# Patient Record
Sex: MALE | Employment: FULL TIME | URBAN - METROPOLITAN AREA
[De-identification: names, ages, dates, MRNs, and addresses within clinical notes are randomized per-mention and may not be internally consistent; named-entity substitution may affect disease eponyms.]

---

## 2017-08-11 PROBLEM — K21.9 GERD (GASTROESOPHAGEAL REFLUX DISEASE): Status: ACTIVE | Noted: 2017-08-11

## 2017-08-11 PROBLEM — I10 HYPERTENSION: Status: ACTIVE | Noted: 2017-08-11

## 2017-08-11 PROBLEM — Z00.00 ANNUAL PHYSICAL EXAM: Status: ACTIVE | Noted: 2017-08-11

## 2017-08-11 PROBLEM — Z79.899 ON STATIN THERAPY: Status: ACTIVE | Noted: 2017-08-11

## 2017-08-11 PROBLEM — E78.5 HYPERLIPIDEMIA: Status: ACTIVE | Noted: 2017-08-11

## 2017-08-11 PROBLEM — N17.9 RENAL FAILURE, ACUTE (HCC): Status: ACTIVE | Noted: 2017-08-11

## 2017-08-11 RX ORDER — BISOPROLOL FUMARATE 10 MG/1
10 TABLET ORAL DAILY
COMMUNITY
End: 2017-10-05 | Stop reason: SDUPTHER

## 2017-08-11 RX ORDER — PHENOL/SODIUM PHENOLATE
20 AEROSOL, SPRAY (ML) MUCOUS MEMBRANE DAILY
COMMUNITY
End: 2019-03-11 | Stop reason: SDUPTHER

## 2017-08-24 ENCOUNTER — OFFICE VISIT (OUTPATIENT)
Dept: INTERNAL MEDICINE CLINIC | Age: 57
End: 2017-08-24

## 2017-08-24 VITALS
HEIGHT: 65 IN | SYSTOLIC BLOOD PRESSURE: 140 MMHG | BODY MASS INDEX: 26.16 KG/M2 | DIASTOLIC BLOOD PRESSURE: 78 MMHG | WEIGHT: 157 LBS

## 2017-08-24 DIAGNOSIS — I10 ESSENTIAL HYPERTENSION: Primary | ICD-10-CM

## 2017-08-24 DIAGNOSIS — Z79.899 ON STATIN THERAPY: ICD-10-CM

## 2017-08-24 DIAGNOSIS — E78.00 PURE HYPERCHOLESTEROLEMIA: ICD-10-CM

## 2017-08-24 PROBLEM — E78.5 HYPERLIPIDEMIA: Status: RESOLVED | Noted: 2017-08-11 | Resolved: 2017-08-24

## 2017-08-24 NOTE — PROGRESS NOTES
This note will not be viewable in [address] Chau Brothers is a 60 y.o. male and presents with Hypertension (6 months) and GERD  . Subjective:  Mr. Kirstin Connors presents today for follow-up of hypertension reflux disease. Remains on statin therapy for hyperlipidemia. He denies any significant complaints related to his medications. He has no congestion cough shortness of breath PND orthopnea pedal edema with chest pain. Past Medical History:   Diagnosis Date    Annual physical exam 8/11/2017    GERD (gastroesophageal reflux disease) 8/11/2017    Hyperlipidemia 8/11/2017    Hypertension 8/11/2017    On statin therapy 8/11/2017    Renal failure, acute ([address] 8/11/2017     History reviewed. No pertinent surgical history. No Known Allergies  Current Outpatient Prescriptions   Medication Sig Dispense Refill    ATORVASTATIN CALCIUM (ATORVASTATIN PO) Take  by mouth.  Omeprazole delayed release (PRILOSEC D/R) 20 mg tablet Take 20 mg by mouth daily.  bisoprolol (ZEBETA) 10 mg tablet Take 10 mg by mouth daily. Social History     Social History    Marital status: SINGLE     Spouse name: N/A    Number of children: N/A    Years of education: N/A     Social History Main Topics    Smoking status: Never Smoker    Smokeless tobacco: Never Used    Alcohol use Yes      Comment: occasionally    Drug use: No    Sexual activity: Not Asked     Other Topics Concern    None     Social History Narrative     Family History   Problem Relation Age of Onset    Cancer Mother     Heart Disease Father        Review of Systems  Constitutional:  negative for fevers, chills, anorexia and weight loss  Eyes:    negative for visual disturbance and irritation  ENT:    negative for tinnitus,sore throat,nasal congestion,ear pains. hoarseness  Respiratory:     negative for cough, hemoptysis, dyspnea,wheezing  CV:    negative for chest pain, palpitations, lower extremity edema  GI:    negative for nausea, vomiting, diarrhea, abdominal pain,melena  Endo:               negative for polyuria,polydipsia,polyphagia,heat intolerance  Genitourinary : negative for frequency, dysuria and hematuria  Integumentary: negative for rash and pruritus  Hematologic:   negative for easy bruising and gum/nose bleeding  Musculoskel:  negative for myalgias, arthralgias, back pain, muscle weakness, joint pain  Neurological:   negative for headaches, dizziness, vertigo, memory problems and gait   Behavl/Psych:  negative for feelings of anxiety, depression, mood changes  ROS otherwise negative      Objective:  Visit Vitals    /78    Ht 5' 5\" (1.651 m)    Wt 157 lb (71.2 kg)    BMI 26.13 kg/m2     Physical Exam:   General appearance - alert, well appearing, and in no distress  Mental status - alert, oriented to person, place, and time  EYE-AMAURY, EOMI, fundi normal, corneas normal, no foreign bodies  ENT-ENT exam normal, no neck nodes or sinus tenderness  Nose - normal and patent, no erythema, discharge or polyps  Mouth - mucous membranes moist, pharynx normal without lesions  Neck - supple, no significant adenopathy   Chest - clear to auscultation, no wheezes, rales or rhonchi, symmetric air entry   Heart - normal rate, regular rhythm, normal S1, S2, no murmurs, rubs, clicks or gallops   Abdomen - soft, nontender, nondistended, no masses or organomegaly  Lymph- no adenopathy palpable  Ext-peripheral pulses normal, no pedal edema, no clubbing or cyanosis  Skin-Warm and dry. no hyperpigmentation, vitiligo, or suspicious lesions  Neuro -alert, oriented, normal speech, no focal findings or movement disorder noted      Assessment/Plan:  Diagnoses and all orders for this visit:    1. Essential hypertension  -     METABOLIC PANEL, COMPREHENSIVE    2. On statin therapy  -     CK  -     METABOLIC PANEL, COMPREHENSIVE    3. Pure hypercholesterolemia  -     LIPID PANEL          ICD-10-CM ICD-9-CM    1.  Essential hypertension X15 867.4 METABOLIC PANEL, COMPREHENSIVE   2. On statin therapy Z79.899 N08.00 CK      METABOLIC PANEL, COMPREHENSIVE   3. Pure hypercholesterolemia E78.00 272.0 LIPID PANEL         Follow-up Disposition:  Return in about 6 months (around 2/24/2018) for CPE. I have reviewed with the patient details of the assessment and plan and all questions were answered. Relevent patient education was performed. Verbal and/or written instructions (see AVS) provided. The most recent lab findings were reviewed with the patient. An After Visit Summary was printed and given to the patient.     Rosamaria Youngblood MD

## 2017-08-24 NOTE — MR AVS SNAPSHOT
Visit Information Date & Time Provider Department Dept. Phone Encounter #  
 8/24/2017  9:10 AM ANNETTE Eli MD [address] [place] [address] [facility] [phone] [id number] Follow-up Instructions Return in about 6 months (around 2/24/2018) for CPE. Upcoming Health Maintenance Date Due Hepatitis C Screening 1960 Pneumococcal 19-64 Highest Risk (1 of 3 - PCV13) 12/5/1977 DTaP/Tdap/Td series (1 - Tdap) 12/5/1979 FOBT Q 1 YEAR AGE 50-75 12/5/2008 INFLUENZA AGE 9 TO ADULT 8/1/2017 Allergies as of 8/24/2017  Review Complete On: 8/24/2017 By: Sunita Tyler MD  
 No Known Allergies Current Immunizations  Never Reviewed No immunizations on file. Not reviewed this visit You Were Diagnosed With   
  
 Codes Comments Essential hypertension    -  Primary ICD-10-CM: I10 
ICD-9-CM: 401.9 On statin therapy     ICD-10-CM: Z79.899 ICD-9-CM: V58.69 Pure hypercholesterolemia     ICD-10-CM: E78.00 ICD-9-CM: 272.0 Vitals BP Height(growth percentile) Weight(growth percentile) BMI Smoking Status 140/78 5' 5\" (1.651 m) 157 lb (71.2 kg) 26.13 kg/m2 Never Smoker Vitals History BMI and BSA Data Body Mass Index Body Surface Area  
 26.13 kg/m 2 1.81 m 2 Your Updated Medication List  
  
   
This list is accurate as of: 8/24/17  9:50 AM.  Always use your most recent med list.  
  
  
  
  
 ATORVASTATIN PO Take  by mouth. bisoprolol 10 mg tablet Commonly known as:  Britney Bowling Take 10 mg by mouth daily. Omeprazole delayed release 20 mg tablet Commonly known as:  PRILOSEC D/R Take 20 mg by mouth daily. We Performed the Following CK [id number] CPT(R)] LIPID PANEL [83736 CPT(R)] METABOLIC PANEL, COMPREHENSIVE [79251 CPT(R)] Follow-up Instructions Return in about 6 months (around 2/24/2018) for CPE. Patient Instructions DASH Diet: Care Instructions Your Care Instructions The DASH diet is an eating plan that can help lower your blood pressure. DASH stands for Dietary Approaches to Stop Hypertension. Hypertension is high blood pressure. The DASH diet focuses on eating foods that are high in calcium, potassium, and magnesium. These nutrients can lower blood pressure. The foods that are highest in these nutrients are fruits, vegetables, low-fat dairy products, nuts, seeds, and legumes. But taking calcium, potassium, and magnesium supplements instead of eating foods that are high in those nutrients does not have the same effect. The DASH diet also includes whole grains, fish, and poultry. The DASH diet is one of several lifestyle changes your doctor may recommend to lower your high blood pressure. Your doctor may also want you to decrease the amount of sodium in your diet. Lowering sodium while following the DASH diet can lower blood pressure even further than just the DASH diet alone. Follow-up care is a key part of your treatment and safety. Be sure to make and go to all appointments, and call your doctor if you are having problems. It's also a good idea to know your test results and keep a list of the medicines you take. How can you care for yourself at home? Following the DASH diet · Eat 4 to 5 servings of fruit each day. A serving is 1 medium-sized piece of fruit, ½ cup chopped or canned fruit, 1/4 cup dried fruit, or 4 ounces (½ cup) of fruit juice. Choose fruit more often than fruit juice. · Eat 4 to 5 servings of vegetables each day. A serving is 1 cup of lettuce or raw leafy vegetables, ½ cup of chopped or cooked vegetables, or 4 ounces (½ cup) of vegetable juice. Choose vegetables more often than vegetable juice. · Get 2 to 3 servings of low-fat and fat-free dairy each day. A serving is 8 ounces of milk, 1 cup of yogurt, or 1 ½ ounces of cheese. · Eat 6 to 8 servings of grains each day.  A serving is 1 slice of bread, 1 ounce of dry cereal, or ½ cup of cooked rice, pasta, or cooked cereal. Try to choose whole-grain products as much as possible. · Limit lean meat, poultry, and fish to 2 servings each day. A serving is 3 ounces, about the size of a deck of cards. · Eat 4 to 5 servings of nuts, seeds, and legumes (cooked dried beans, lentils, and split peas) each week. A serving is 1/3 cup of nuts, 2 tablespoons of seeds, or ½ cup of cooked beans or peas. · Limit fats and oils to 2 to 3 servings each day. A serving is 1 teaspoon of vegetable oil or 2 tablespoons of salad dressing. · Limit sweets and added sugars to 5 servings or less a week. A serving is 1 tablespoon jelly or jam, ½ cup sorbet, or 1 cup of lemonade. · Eat less than 2,300 milligrams (mg) of sodium a day. If you limit your sodium to 1,500 mg a day, you can lower your blood pressure even more. Tips for success · Start small. Do not try to make dramatic changes to your diet all at once. You might feel that you are missing out on your favorite foods and then be more likely to not follow the plan. Make small changes, and stick with them. Once those changes become habit, add a few more changes. · Try some of the following: ¨ Make it a goal to eat a fruit or vegetable at every meal and at snacks. This will make it easy to get the recommended amount of fruits and vegetables each day. ¨ Try yogurt topped with fruit and nuts for a snack or healthy dessert. ¨ Add lettuce, tomato, cucumber, and onion to sandwiches. ¨ Combine a ready-made pizza crust with low-fat mozzarella cheese and lots of vegetable toppings. Try using tomatoes, squash, spinach, broccoli, carrots, cauliflower, and onions. ¨ Have a variety of cut-up vegetables with a low-fat dip as an appetizer instead of chips and dip. ¨ Sprinkle sunflower seeds or chopped almonds over salads. Or try adding chopped walnuts or almonds to cooked vegetables. ¨ Try some vegetarian meals using beans and peas. Add garbanzo or kidney beans to salads. Make burritos and tacos with mashed das beans or black beans. Where can you learn more? Go to http://sarah-tha.info/. Enter Y964 in the search box to learn more about \"DASH Diet: Care Instructions. \" Current as of: April 3, 2017 Content Version: 11.3 © 7837-6575 No Paper Just [employer]apor. Care instructions adapted under license by [employer] (which disclaims liability or warranty for this information). If you have questions about a medical condition or this instruction, always ask your healthcare professional. [address] any warranty or liability for your use of this information. Introducing [facility]! Silverio Ferguson introduces Mavin patient portal. Now you can access parts of your medical record, email your doctor's office, and request medication refills online. 1. In your internet browser, go to [email or web address] 2. Click on the First Time User? Click Here link in the Sign In box. You will see the New Member Sign Up page. 3. Enter your Mavin Access Code exactly as it appears below. You will not need to use this code after youve completed the sign-up process. If you do not sign up before the expiration date, you must request a new code. · Mavin Access Code: [email or web address] Expires: 11/22/2017  9:18 AM 
 
4. Enter the last four digits of your Social Security Number (xxxx) and Date of Birth (mm/dd/yyyy) as indicated and click Submit. You will be taken to the next sign-up page. 5. Create a Mavin ID. This will be your Mavin login ID and cannot be changed, so think of one that is secure and easy to remember. 6. Create a Mavin password. You can change your password at any time. 7. Enter your Password Reset Question and Answer. This can be used at a later time if you forget your password. 8. Enter your e-mail address. You will receive e-mail notification when new information is available in [address] 9. Click Sign Up. You can now view and download portions of your medical record. 10. Click the Download Summary menu link to download a portable copy of your medical information. If you have questions, please visit the Frequently Asked Questions section of the Skim.it website. Remember, Skim.it is NOT to be used for urgent needs. For medical emergencies, dial 911. Now available from your iPhone and Android! Please provide this summary of care documentation to your next provider. Your primary care clinician is listed as ANNETTE Browne. If you have any questions after today's visit, please call [phone].

## 2017-08-24 NOTE — PATIENT INSTRUCTIONS

## 2017-08-24 NOTE — PROGRESS NOTES
Present to Hypertension and GERD. 1. Have you been to the ER, urgent care clinic since your last visit? Hospitalized since your last visit? No    2. Have you seen or consulted any other health care providers outside of the [address] [place] [address] since your last visit? Include any pap smears or colon screening.  No

## 2017-08-25 LAB
ALBUMIN SERPL-MCNC: 4.4 G/DL (ref 3.5–5.5)
ALBUMIN/GLOB SERPL: 1.9 {RATIO} (ref 1.2–2.2)
ALP SERPL-CCNC: 76 IU/L (ref 39–117)
ALT SERPL-CCNC: 15 IU/L (ref 0–44)
AST SERPL-CCNC: 17 IU/L (ref 0–40)
BILIRUB SERPL-MCNC: 1.4 MG/DL (ref 0–1.2)
BUN SERPL-MCNC: 18 MG/DL (ref 6–24)
BUN/CREAT SERPL: 18 (ref 9–20)
CALCIUM SERPL-MCNC: 9.3 MG/DL (ref 8.7–10.2)
CHLORIDE SERPL-SCNC: 101 MMOL/L (ref 96–106)
CHOLEST SERPL-MCNC: 121 MG/DL (ref 100–199)
CK SERPL-CCNC: 50 U/L (ref 24–204)
CO2 SERPL-SCNC: 26 MMOL/L (ref 18–29)
CREAT SERPL-MCNC: 0.99 MG/DL (ref 0.76–1.27)
GLOBULIN SER CALC-MCNC: 2.3 G/DL (ref 1.5–4.5)
GLUCOSE SERPL-MCNC: 110 MG/DL (ref 65–99)
HDLC SERPL-MCNC: 45 MG/DL
LDLC SERPL CALC-MCNC: 65 MG/DL (ref 0–99)
POTASSIUM SERPL-SCNC: 4.6 MMOL/L (ref 3.5–5.2)
PROT SERPL-MCNC: 6.7 G/DL (ref 6–8.5)
SODIUM SERPL-SCNC: 142 MMOL/L (ref 134–144)
TRIGL SERPL-MCNC: 56 MG/DL (ref 0–149)
VLDLC SERPL CALC-MCNC: 11 MG/DL (ref 5–40)

## 2017-08-25 NOTE — PROGRESS NOTES
Cholesterol is excellent with a total cholesterol 151 and an LDL cholesterol 65. Glucose was normal at 110 with normal liver and kidney function. Bilirubin was just above normal but often seen in fasting and not significant.

## 2017-10-05 RX ORDER — ATORVASTATIN CALCIUM 10 MG/1
TABLET, FILM COATED ORAL
Qty: 90 TAB | Refills: 2 | Status: SHIPPED | OUTPATIENT
Start: 2017-10-05 | End: 2019-03-13

## 2017-10-05 RX ORDER — BISOPROLOL FUMARATE 10 MG/1
TABLET ORAL
Qty: 90 TAB | Refills: 2 | Status: SHIPPED | OUTPATIENT
Start: 2017-10-05 | End: 2019-03-11 | Stop reason: ALTCHOICE

## 2017-10-05 RX ORDER — OMEPRAZOLE 20 MG/1
CAPSULE, DELAYED RELEASE ORAL
Qty: 90 CAP | Refills: 2 | Status: SHIPPED | OUTPATIENT
Start: 2017-10-05

## 2019-03-11 ENCOUNTER — HOSPITAL ENCOUNTER (INPATIENT)
Age: 59
LOS: 2 days | Discharge: HOME HEALTH | DRG: 247 | End: 2019-03-13
Attending: EMERGENCY MEDICINE | Admitting: INTERNAL MEDICINE
Payer: COMMERCIAL

## 2019-03-11 DIAGNOSIS — I21.3 ST ELEVATION MYOCARDIAL INFARCTION (STEMI), UNSPECIFIED ARTERY (HCC): Primary | ICD-10-CM

## 2019-03-11 DIAGNOSIS — I21.3 ST ELEVATION (STEMI) MYOCARDIAL INFARCTION (HCC): ICD-10-CM

## 2019-03-11 PROBLEM — I21.02 ACUTE ST ELEVATION MYOCARDIAL INFARCTION (STEMI) INVOLVING LEFT ANTERIOR DESCENDING (LAD) CORONARY ARTERY (HCC): Status: ACTIVE | Noted: 2019-03-11

## 2019-03-11 LAB
ALBUMIN SERPL-MCNC: 3.6 G/DL (ref 3.5–5)
ALBUMIN/GLOB SERPL: 1.1 {RATIO} (ref 1.1–2.2)
ALP SERPL-CCNC: 81 U/L (ref 45–117)
ALT SERPL-CCNC: 21 U/L (ref 12–78)
ANION GAP SERPL CALC-SCNC: 9 MMOL/L (ref 5–15)
APTT PPP: 22.1 SEC (ref 22.1–32)
AST SERPL-CCNC: 20 U/L (ref 15–37)
ATRIAL RATE: 62 BPM
BASOPHILS # BLD: 0.1 K/UL (ref 0–0.1)
BASOPHILS NFR BLD: 1 % (ref 0–1)
BILIRUB SERPL-MCNC: 1.5 MG/DL (ref 0.2–1)
BUN SERPL-MCNC: 15 MG/DL (ref 6–20)
BUN/CREAT SERPL: 12 (ref 12–20)
CALCIUM SERPL-MCNC: 8.1 MG/DL (ref 8.5–10.1)
CALCULATED P AXIS, ECG09: 24 DEGREES
CALCULATED R AXIS, ECG10: 10 DEGREES
CALCULATED T AXIS, ECG11: 81 DEGREES
CHLORIDE SERPL-SCNC: 107 MMOL/L (ref 97–108)
CK MB CFR SERPL CALC: 7.2 % (ref 0–2.5)
CK MB SERPL-MCNC: 7.4 NG/ML (ref 5–25)
CK SERPL-CCNC: 103 U/L (ref 39–308)
CK SERPL-CCNC: 68 U/L (ref 39–308)
CO2 SERPL-SCNC: 24 MMOL/L (ref 21–32)
CREAT SERPL-MCNC: 1.21 MG/DL (ref 0.7–1.3)
DIAGNOSIS, 93000: NORMAL
DIFFERENTIAL METHOD BLD: NORMAL
END DIASTOLIC PRESSURE: 7
EOSINOPHIL # BLD: 0.1 K/UL (ref 0–0.4)
EOSINOPHIL NFR BLD: 1 % (ref 0–7)
ERYTHROCYTE [DISTWIDTH] IN BLOOD BY AUTOMATED COUNT: 11.9 % (ref 11.5–14.5)
GLOBULIN SER CALC-MCNC: 3.4 G/DL (ref 2–4)
GLUCOSE SERPL-MCNC: 170 MG/DL (ref 65–100)
HCT VFR BLD AUTO: 46 % (ref 36.6–50.3)
HGB BLD-MCNC: 15.9 G/DL (ref 12.1–17)
IMM GRANULOCYTES # BLD AUTO: 0 K/UL (ref 0–0.04)
IMM GRANULOCYTES NFR BLD AUTO: 0 % (ref 0–0.5)
INR PPP: 1 (ref 0.9–1.1)
LYMPHOCYTES # BLD: 2.7 K/UL (ref 0.8–3.5)
LYMPHOCYTES NFR BLD: 26 % (ref 12–49)
MCH RBC QN AUTO: 31.6 PG (ref 26–34)
MCHC RBC AUTO-ENTMCNC: 34.6 G/DL (ref 30–36.5)
MCV RBC AUTO: 91.5 FL (ref 80–99)
MONOCYTES # BLD: 0.6 K/UL (ref 0–1)
MONOCYTES NFR BLD: 6 % (ref 5–13)
NEUTS SEG # BLD: 6.6 K/UL (ref 1.8–8)
NEUTS SEG NFR BLD: 66 % (ref 32–75)
NRBC # BLD: 0 K/UL (ref 0–0.01)
NRBC BLD-RTO: 0 PER 100 WBC
P-R INTERVAL, ECG05: 162 MS
PLATELET # BLD AUTO: 231 K/UL (ref 150–400)
PMV BLD AUTO: 11.7 FL (ref 8.9–12.9)
POTASSIUM SERPL-SCNC: 3.6 MMOL/L (ref 3.5–5.1)
PROT SERPL-MCNC: 7 G/DL (ref 6.4–8.2)
PROTHROMBIN TIME: 10.6 SEC (ref 9–11.1)
Q-T INTERVAL, ECG07: 426 MS
QRS DURATION, ECG06: 84 MS
QTC CALCULATION (BEZET), ECG08: 432 MS
RBC # BLD AUTO: 5.03 M/UL (ref 4.1–5.7)
SODIUM SERPL-SCNC: 140 MMOL/L (ref 136–145)
THERAPEUTIC RANGE,PTTT: NORMAL SECS (ref 58–77)
TROPONIN I SERPL-MCNC: 0.07 NG/ML
TROPONIN I SERPL-MCNC: 0.87 NG/ML
VENTRICULAR RATE, ECG03: 62 BPM
WBC # BLD AUTO: 10.1 K/UL (ref 4.1–11.1)

## 2019-03-11 PROCEDURE — 84484 ASSAY OF TROPONIN QUANT: CPT

## 2019-03-11 PROCEDURE — 027034Z DILATION OF CORONARY ARTERY, ONE ARTERY WITH DRUG-ELUTING INTRALUMINAL DEVICE, PERCUTANEOUS APPROACH: ICD-10-PCS | Performed by: INTERNAL MEDICINE

## 2019-03-11 PROCEDURE — 85730 THROMBOPLASTIN TIME PARTIAL: CPT

## 2019-03-11 PROCEDURE — 99283 EMERGENCY DEPT VISIT LOW MDM: CPT

## 2019-03-11 PROCEDURE — 82553 CREATINE MB FRACTION: CPT

## 2019-03-11 PROCEDURE — C1894 INTRO/SHEATH, NON-LASER: HCPCS | Performed by: INTERNAL MEDICINE

## 2019-03-11 PROCEDURE — 93005 ELECTROCARDIOGRAM TRACING: CPT

## 2019-03-11 PROCEDURE — 77030003623 HC NDL PERC VASC TELE -C: Performed by: INTERNAL MEDICINE

## 2019-03-11 PROCEDURE — 96374 THER/PROPH/DIAG INJ IV PUSH: CPT

## 2019-03-11 PROCEDURE — B2111ZZ FLUOROSCOPY OF MULTIPLE CORONARY ARTERIES USING LOW OSMOLAR CONTRAST: ICD-10-PCS | Performed by: INTERNAL MEDICINE

## 2019-03-11 PROCEDURE — 36415 COLL VENOUS BLD VENIPUNCTURE: CPT

## 2019-03-11 PROCEDURE — 99153 MOD SED SAME PHYS/QHP EA: CPT | Performed by: INTERNAL MEDICINE

## 2019-03-11 PROCEDURE — 85347 COAGULATION TIME ACTIVATED: CPT

## 2019-03-11 PROCEDURE — 99152 MOD SED SAME PHYS/QHP 5/>YRS: CPT | Performed by: INTERNAL MEDICINE

## 2019-03-11 PROCEDURE — 77030019697 HC SYR ANGI INFL MRTM -B: Performed by: INTERNAL MEDICINE

## 2019-03-11 PROCEDURE — 80053 COMPREHEN METABOLIC PANEL: CPT

## 2019-03-11 PROCEDURE — C1760 CLOSURE DEV, VASC: HCPCS | Performed by: INTERNAL MEDICINE

## 2019-03-11 PROCEDURE — 74011250636 HC RX REV CODE- 250/636

## 2019-03-11 PROCEDURE — 93458 L HRT ARTERY/VENTRICLE ANGIO: CPT | Performed by: INTERNAL MEDICINE

## 2019-03-11 PROCEDURE — C1769 GUIDE WIRE: HCPCS | Performed by: INTERNAL MEDICINE

## 2019-03-11 PROCEDURE — 74011636320 HC RX REV CODE- 636/320: Performed by: INTERNAL MEDICINE

## 2019-03-11 PROCEDURE — 77030030195 HC CATH ANGI DX PRF4 MRTM -A: Performed by: INTERNAL MEDICINE

## 2019-03-11 PROCEDURE — 74011250636 HC RX REV CODE- 250/636: Performed by: INTERNAL MEDICINE

## 2019-03-11 PROCEDURE — 74011250637 HC RX REV CODE- 250/637: Performed by: INTERNAL MEDICINE

## 2019-03-11 PROCEDURE — 85025 COMPLETE CBC W/AUTO DIFF WBC: CPT

## 2019-03-11 PROCEDURE — 74011000250 HC RX REV CODE- 250: Performed by: INTERNAL MEDICINE

## 2019-03-11 PROCEDURE — 4A023N7 MEASUREMENT OF CARDIAC SAMPLING AND PRESSURE, LEFT HEART, PERCUTANEOUS APPROACH: ICD-10-PCS | Performed by: INTERNAL MEDICINE

## 2019-03-11 PROCEDURE — 77030004549 HC CATH ANGI DX PRF MRTM -A: Performed by: INTERNAL MEDICINE

## 2019-03-11 PROCEDURE — 92928 PRQ TCAT PLMT NTRAC ST 1 LES: CPT | Performed by: INTERNAL MEDICINE

## 2019-03-11 PROCEDURE — 85610 PROTHROMBIN TIME: CPT

## 2019-03-11 PROCEDURE — C1874 STENT, COATED/COV W/DEL SYS: HCPCS | Performed by: INTERNAL MEDICINE

## 2019-03-11 PROCEDURE — 65660000000 HC RM CCU STEPDOWN

## 2019-03-11 PROCEDURE — C1887 CATHETER, GUIDING: HCPCS | Performed by: INTERNAL MEDICINE

## 2019-03-11 PROCEDURE — 82550 ASSAY OF CK (CPK): CPT

## 2019-03-11 PROCEDURE — 77030018729 HC ELECTRD DEFIB PAD CARD -B: Performed by: INTERNAL MEDICINE

## 2019-03-11 PROCEDURE — C1725 CATH, TRANSLUMIN NON-LASER: HCPCS | Performed by: INTERNAL MEDICINE

## 2019-03-11 PROCEDURE — 94762 N-INVAS EAR/PLS OXIMTRY CONT: CPT

## 2019-03-11 PROCEDURE — 77030028837 HC SYR ANGI PWR INJ COEU -A: Performed by: INTERNAL MEDICINE

## 2019-03-11 DEVICE — STENT RSINT30015UX MICROTRAC 3.00X15RX
Type: IMPLANTABLE DEVICE | Status: FUNCTIONAL
Brand: RESOLUTE INTEGRITY™

## 2019-03-11 RX ORDER — SODIUM CHLORIDE 0.9 % (FLUSH) 0.9 %
5-40 SYRINGE (ML) INJECTION AS NEEDED
Status: DISCONTINUED | OUTPATIENT
Start: 2019-03-11 | End: 2019-03-13 | Stop reason: HOSPADM

## 2019-03-11 RX ORDER — SODIUM CHLORIDE 0.9 % (FLUSH) 0.9 %
5-40 SYRINGE (ML) INJECTION EVERY 8 HOURS
Status: DISCONTINUED | OUTPATIENT
Start: 2019-03-11 | End: 2019-03-13 | Stop reason: HOSPADM

## 2019-03-11 RX ORDER — METOPROLOL TARTRATE 25 MG/1
25 TABLET, FILM COATED ORAL 2 TIMES DAILY
Status: DISCONTINUED | OUTPATIENT
Start: 2019-03-11 | End: 2019-03-13 | Stop reason: HOSPADM

## 2019-03-11 RX ORDER — HEPARIN SODIUM 5000 [USP'U]/ML
4000 INJECTION, SOLUTION INTRAVENOUS; SUBCUTANEOUS
Status: COMPLETED | OUTPATIENT
Start: 2019-03-11 | End: 2019-03-11

## 2019-03-11 RX ORDER — PANTOPRAZOLE SODIUM 40 MG/1
40 TABLET, DELAYED RELEASE ORAL
Status: DISCONTINUED | OUTPATIENT
Start: 2019-03-12 | End: 2019-03-13 | Stop reason: HOSPADM

## 2019-03-11 RX ORDER — METOPROLOL SUCCINATE 50 MG/1
50 TABLET, EXTENDED RELEASE ORAL DAILY
Refills: 0 | COMMUNITY
Start: 2019-03-06

## 2019-03-11 RX ORDER — HEPARIN SODIUM 200 [USP'U]/100ML
INJECTION, SOLUTION INTRAVENOUS
Status: COMPLETED | OUTPATIENT
Start: 2019-03-11 | End: 2019-03-11

## 2019-03-11 RX ORDER — ZOLPIDEM TARTRATE 5 MG/1
5 TABLET ORAL
Status: DISCONTINUED | OUTPATIENT
Start: 2019-03-11 | End: 2019-03-13 | Stop reason: HOSPADM

## 2019-03-11 RX ORDER — FENTANYL CITRATE 50 UG/ML
INJECTION, SOLUTION INTRAMUSCULAR; INTRAVENOUS AS NEEDED
Status: DISCONTINUED | OUTPATIENT
Start: 2019-03-11 | End: 2019-03-11 | Stop reason: HOSPADM

## 2019-03-11 RX ORDER — GUAIFENESIN 100 MG/5ML
81 LIQUID (ML) ORAL DAILY
Status: DISCONTINUED | OUTPATIENT
Start: 2019-03-12 | End: 2019-03-13 | Stop reason: HOSPADM

## 2019-03-11 RX ORDER — ATORVASTATIN CALCIUM 40 MG/1
80 TABLET, FILM COATED ORAL
Status: DISCONTINUED | OUTPATIENT
Start: 2019-03-11 | End: 2019-03-13 | Stop reason: HOSPADM

## 2019-03-11 RX ORDER — LIDOCAINE HYDROCHLORIDE 10 MG/ML
INJECTION, SOLUTION EPIDURAL; INFILTRATION; INTRACAUDAL; PERINEURAL AS NEEDED
Status: DISCONTINUED | OUTPATIENT
Start: 2019-03-11 | End: 2019-03-11 | Stop reason: HOSPADM

## 2019-03-11 RX ORDER — SODIUM CHLORIDE 9 MG/ML
150 INJECTION, SOLUTION INTRAVENOUS CONTINUOUS
Status: DISPENSED | OUTPATIENT
Start: 2019-03-11 | End: 2019-03-11

## 2019-03-11 RX ORDER — MIDAZOLAM HYDROCHLORIDE 1 MG/ML
INJECTION, SOLUTION INTRAMUSCULAR; INTRAVENOUS AS NEEDED
Status: DISCONTINUED | OUTPATIENT
Start: 2019-03-11 | End: 2019-03-11 | Stop reason: HOSPADM

## 2019-03-11 RX ORDER — ACETAMINOPHEN 325 MG/1
TABLET ORAL
Status: DISPENSED
Start: 2019-03-11 | End: 2019-03-12

## 2019-03-11 RX ORDER — ACETAMINOPHEN 325 MG/1
650 TABLET ORAL
Status: DISCONTINUED | OUTPATIENT
Start: 2019-03-11 | End: 2019-03-13 | Stop reason: HOSPADM

## 2019-03-11 RX ORDER — HEPARIN SODIUM 1000 [USP'U]/ML
INJECTION, SOLUTION INTRAVENOUS; SUBCUTANEOUS AS NEEDED
Status: DISCONTINUED | OUTPATIENT
Start: 2019-03-11 | End: 2019-03-11 | Stop reason: HOSPADM

## 2019-03-11 RX ADMIN — METOPROLOL TARTRATE 25 MG: 25 TABLET ORAL at 18:10

## 2019-03-11 RX ADMIN — SODIUM CHLORIDE 150 ML/HR: 900 INJECTION, SOLUTION INTRAVENOUS at 16:00

## 2019-03-11 RX ADMIN — Medication 10 ML: at 21:54

## 2019-03-11 RX ADMIN — ACETAMINOPHEN 650 MG: 325 TABLET ORAL at 23:50

## 2019-03-11 RX ADMIN — HEPARIN SODIUM 4000 UNITS: 5000 INJECTION INTRAVENOUS; SUBCUTANEOUS at 13:51

## 2019-03-11 RX ADMIN — TICAGRELOR 180 MG: 90 TABLET ORAL at 13:52

## 2019-03-11 NOTE — Clinical Note
TRANSFER - OUT REPORT:  
 
Verbal report given to: TRISTEN dudley. Report consisted of patient's Situation, Background, Assessment and  
Recommendations(SBAR). Opportunity for questions and clarification was provided. Patient transported with a Registered Nurse and [address] [place] [address] / [facility]. Patient transported to: [facility].

## 2019-03-11 NOTE — H&P
Consult/Admission    NAME: Selvin Clancy   :  1960   MRN:  [id number]     Date/Time:  3/11/2019 3:16 PM    Patient PCP: Raye Shone, MD  ________________________________________________________________________     Assessment:     Acute Anterior STEMI   CAD   HTN  Hyperlipidemia. Strong Family hx of CAD / MI     Occluded LAD , with PCI with Resolute LISSETT . No resdual.  CAL 3 flow. All other coronaries normal.   LV EF 45 % . Apical hypokinesis . Hemodynamics stable. Plan:     Post intervention management per orders. [x]           High complexity decision making was performed        Subjective:   CHIEF COMPLAINT:  Chest Pain     HISTORY OF PRESENT ILLNESS:     Ronny Asher is a 59 y.o.  male who started with Anterior chest pain / pressure and called 911. EKG in squad showed anterior STEMI . Confirmed in ER . Hemodynamically  stable. No hx of CAD . Risks as above. IN ER received heparin 4000,  ASA . Brilinta 180 mg. To cath lab. We were asked to admit for work up and evaluation of the above problems. Past Medical History:   Diagnosis Date    Annual physical exam 2017    GERD (gastroesophageal reflux disease) 2017    Hyperlipidemia 2017    Hypertension 2017    On statin therapy 2017    Renal failure, acute ([address] 2017      No past surgical history on file.   No Known Allergies   Meds:  See below  Social History     Tobacco Use    Smoking status: Never Smoker    Smokeless tobacco: Never Used   Substance Use Topics    Alcohol use: Yes     Comment: occasionally      Family History   Problem Relation Age of Onset    Cancer Mother     Heart Disease Father        REVIEW OF SYSTEMS:     []            Unable to obtain  ROS due to ---   [x]            Total of 12 systems reviewed as follows:    Constitutional: negative fever, negative chills, negative weight loss  Eyes:   negative visual changes  ENT: negative sore throat, tongue or lip swelling  Respiratory:  negative cough, negative dyspnea  Cards:  negative for chest pain, palpitations, lower extremity edema  GI:   negative for nausea, vomiting, diarrhea, and abdominal pain  Genitourinary: negative for frequency, dysuria  Integument:  negative for rash   Hematologic:  negative for easy bruising and gum/nose bleeding  Musculoskel: negative for myalgias,  back pain  Neurological:  negative for headaches, dizziness, vertigo, weakness  Behavl/Psych: negative for feelings of anxiety, depression     Pertinent Positives include :    Objective:      Physical Exam:    Last 24hrs VS reviewed since prior progress note. Most recent are:    Visit Vitals  /68   Pulse 73   Temp 97.5 °F (36.4 °C)   Resp 16   SpO2 96%     No intake or output data in the 24 hours ending 03/11/19 1516     General Appearance: Well developed, well nourished, alert & oriented x 3,    no acute distress. Ears/Nose/Mouth/Throat: Pupils equal and round, Hearing grossly normal.  Neck: Supple. JVP within normal limits. Carotids good upstrokes, with no bruit. Chest: Lungs clear to auscultation bilaterally. Cardiovascular: Regular rate and rhythm, S1S2 normal, no murmur, rubs, gallops. Abdomen: Soft, non-tender, bowel sounds are active. No organomegaly. Extremities: No edema bilaterally. Femoral pulses +2, Distal Pulses +1. Skin: Warm and dry. Neuro: CN II-XII grossly intact, Strength and sensation grossly intact. Data:      Prior to Admission medications    Medication Sig Start Date End Date Taking? Authorizing Provider   metoprolol succinate (TOPROL-XL) 50 mg XL tablet Take 50 mg by mouth daily.  3/6/19  Yes Provider, Historical   omeprazole (PRILOSEC) 20 mg capsule TAKE 1 CAPSULE DAILY 10/5/17  Yes Raye Shone, MD   atorvastatin (LIPITOR) 10 mg tablet TAKE 1 TABLET DAILY 10/5/17  Yes Raye Shone, MD       Recent Results (from the past 24 hour(s))   EKG, 12 LEAD, INITIAL Collection Time: 03/11/19  1:38 PM   Result Value Ref Range    Ventricular Rate 62 BPM    Atrial Rate 62 BPM    P-R Interval 162 ms    QRS Duration 84 ms    Q-T Interval 426 ms    QTC Calculation (Bezet) 432 ms    Calculated P Axis 24 degrees    Calculated R Axis 10 degrees    Calculated T Axis 81 degrees    Diagnosis       Normal sinus rhythm  ST elevation, consider inferior injury or acute infarct  ST elevation, consider anterior injury or acute infarct  ** ** ACUTE MI / STEMI ** **  Consider right ventricular involvement in acute inferior infarct  No previous ECGs available     CBC WITH AUTOMATED DIFF    Collection Time: 03/11/19  1:56 PM   Result Value Ref Range    WBC 10.1 4.1 - 11.1 K/uL    RBC 5.03 4. 10 - 5.70 M/uL    HGB 15.9 12.1 - 17.0 g/dL    HCT 46.0 36.6 - 50.3 %    MCV 91.5 80.0 - 99.0 FL    MCH 31.6 26.0 - 34.0 PG    MCHC 34.6 30.0 - 36.5 g/dL    RDW 11.9 11.5 - 14.5 %    PLATELET 028 657 - 356 K/uL    MPV 11.7 8.9 - 12.9 FL    NRBC 0.0 0  WBC    ABSOLUTE NRBC 0.00 0.00 - 0.01 K/uL    NEUTROPHILS 66 32 - 75 %    LYMPHOCYTES 26 12 - 49 %    MONOCYTES 6 5 - 13 %    EOSINOPHILS 1 0 - 7 %    BASOPHILS 1 0 - 1 %    IMMATURE GRANULOCYTES 0 0.0 - 0.5 %    ABS. NEUTROPHILS 6.6 1.8 - 8.0 K/UL    ABS. LYMPHOCYTES 2.7 0.8 - 3.5 K/UL    ABS. MONOCYTES 0.6 0.0 - 1.0 K/UL    ABS. EOSINOPHILS 0.1 0.0 - 0.4 K/UL    ABS. BASOPHILS 0.1 0.0 - 0.1 K/UL    ABS. IMM.  GRANS. 0.0 0.00 - 0.04 K/UL    DF AUTOMATED     METABOLIC PANEL, COMPREHENSIVE    Collection Time: 03/11/19  1:56 PM   Result Value Ref Range    Sodium 140 136 - 145 mmol/L    Potassium 3.6 3.5 - 5.1 mmol/L    Chloride 107 97 - 108 mmol/L    CO2 24 21 - 32 mmol/L    Anion gap 9 5 - 15 mmol/L    Glucose 170 (H) 65 - 100 mg/dL    BUN 15 6 - 20 MG/DL    Creatinine 1.21 0.70 - 1.30 MG/DL    BUN/Creatinine ratio 12 12 - 20      GFR est AA >60 >60 ml/min/1.73m2    GFR est non-AA >60 >60 ml/min/1.73m2    Calcium 8.1 (L) 8.5 - 10.1 MG/DL    Bilirubin, total 1.5 (H) 0.2 - 1.0 MG/DL    ALT (SGPT) 21 12 - 78 U/L    AST (SGOT) 20 15 - 37 U/L    Alk.  phosphatase 81 45 - 117 U/L    Protein, total 7.0 6.4 - 8.2 g/dL    Albumin 3.6 3.5 - 5.0 g/dL    Globulin 3.4 2.0 - 4.0 g/dL    A-G Ratio 1.1 1.1 - 2.2     CK W/ REFLX CKMB    Collection Time: 03/11/19  1:56 PM   Result Value Ref Range    CK 68 39 - 308 U/L   TROPONIN I    Collection Time: 03/11/19  1:56 PM   Result Value Ref Range    Troponin-I, Qt. 0.07 (H) <0.05 ng/mL   PROTHROMBIN TIME + INR    Collection Time: 03/11/19  1:56 PM   Result Value Ref Range    INR 1.0 0.9 - 1.1      Prothrombin time 10.6 9.0 - 11.1 sec   PTT    Collection Time: 03/11/19  1:56 PM   Result Value Ref Range    aPTT 22.1 22.1 - 32.0 sec    aPTT, therapeutic range     58.0 - 77.0 SECS   CARDIAC PROCEDURE    Collection Time: 03/11/19  2:50 PM   Result Value Ref Range    EDP 7

## 2019-03-11 NOTE — PROGRESS NOTES
Cath    /   PCI of LAD     Acute Anteriror STEMI     LAD total occlusion after 1st Diagonal .   Mid LAD     LMT/ RCA and Cx arteries normal.     PCI of LAD with 3.0 Resolute LISSETT with NC balloon to 15 MICHAEL . 100%  / CAL 0 - 0%  /  CAL 3. Angio seal R Femoral      D to Balloon 37 min. Full report completed in Cardiology section.

## 2019-03-11 NOTE — PROGRESS NOTES
Pharmacy Medication Reconciliation     The patient was interviewed regarding current PTA medication list, use and drug allergies;  patient was present in room and obtained permission from patient to discuss drug regimen with visitor(s) present. The patient was questioned regarding use of any other inhalers, topical products, over the counter medications, herbal medications, vitamin products or ophthalmic/nasal/otic medication use. Allergy Update: [facility]    Recommendations/Findings: The following amendments were made to the patient's active medication list on file at [facility]:   1) Additions:   +metoprolol succinate 50mg ER: take 50mg po daily    2) Deletions:   -bisoprolol 10mg po daily--not a current med    3) Changes: none      -Clarified PTA med list with Rx query, and patient states no changes prior to leaving for stemi. PTA medication list was corrected to the following:     Prior to Admission Medications   Prescriptions Last Dose Informant Patient Reported? Taking?   atorvastatin (LIPITOR) 10 mg tablet   No Yes   Sig: TAKE 1 TABLET DAILY   metoprolol succinate (TOPROL-XL) 50 mg XL tablet   Yes Yes   Sig: Take 50 mg by mouth daily.    omeprazole (PRILOSEC) 20 mg capsule   No Yes   Sig: TAKE 1 CAPSULE DAILY      Facility-Administered Medications: None          Thank you,  DG Coates

## 2019-03-11 NOTE — ED NOTES
Cath lab pre alerted, in ED upon arrival, Dr. Xavi Meléndez at bedside, Dr. Alfie Li at bedside, EKG obtained, IV established in [facility] by EMS, 2nd IV obtained by Sharona Semen, PCT and blood work done

## 2019-03-11 NOTE — Clinical Note
Lesion: Located in the Mid LM. Stent inserted. Single technique used. First inflation pressure = 14 maggy; inflation time: 22 sec.

## 2019-03-11 NOTE — ED PROVIDER NOTES
EMERGENCY DEPARTMENT HISTORY AND PHYSICAL EXAM      Date: 3/11/2019  Patient Name: Selvin Clancy    History of Presenting Illness     Chief Complaint   Patient presents with    Chest Pain       History Provided By: EMS    HPI: Selvin Clancy, 59 y.o. male with PMHx significant for HTN, HLD, FHx significant for CAD presents via EMS to the ED with cc of acute onset substernal chest pain at 12:40 pm today. Patient reports he was performing yard work when chest pain started. He also reports left arm pain. EMS reports they gave patient ASA, NTG, and nitropaste en route. Patient denies NV, syncope, or abdominal pain. There are no other complaints, changes, or physical findings at this time. PCP: Raye Shone, MD    No current facility-administered medications on file prior to encounter. Current Outpatient Medications on File Prior to Encounter   Medication Sig Dispense Refill    metoprolol succinate (TOPROL-XL) 50 mg XL tablet Take 50 mg by mouth daily. 0    omeprazole (PRILOSEC) 20 mg capsule TAKE 1 CAPSULE DAILY 90 Cap 2    atorvastatin (LIPITOR) 10 mg tablet TAKE 1 TABLET DAILY 90 Tab 2       Past History     Past Medical History:  Past Medical History:   Diagnosis Date    Annual physical exam 8/11/2017    GERD (gastroesophageal reflux disease) 8/11/2017    Hyperlipidemia 8/11/2017    Hypertension 8/11/2017    On statin therapy 8/11/2017    Renal failure, acute ([address] 8/11/2017       Past Surgical History:  No past surgical history on file. Family History:  Family History   Problem Relation Age of Onset    Cancer Mother     Heart Disease Father        Social History:  Social History     Tobacco Use    Smoking status: Never Smoker    Smokeless tobacco: Never Used   Substance Use Topics    Alcohol use: Yes     Comment: occasionally    Drug use: No       Allergies:  No Known Allergies      Review of Systems   Review of Systems   Constitutional: Negative.   Negative for appetite change, chills, fatigue and fever. HENT: Negative. Negative for congestion, rhinorrhea, sinus pressure and sore throat. Eyes: Negative. Respiratory: Negative. Negative for cough, choking, chest tightness, shortness of breath and wheezing. Cardiovascular: Positive for chest pain. Negative for palpitations and leg swelling. Gastrointestinal: Negative for abdominal pain, constipation, diarrhea, nausea and vomiting. Endocrine: Negative. Genitourinary: Negative. Negative for difficulty urinating, dysuria, flank pain and urgency. Musculoskeletal: Negative. Skin: Negative. Neurological: Negative. Negative for dizziness, speech difficulty, weakness, light-headedness, numbness and headaches. Psychiatric/Behavioral: Negative. All other systems reviewed and are negative. Physical Exam   Physical Exam   Constitutional: He is oriented to person, place, and time. He appears well-developed and well-nourished. He appears distressed. HENT:   Head: Normocephalic and atraumatic. Mouth/Throat: Oropharynx is clear and moist. No oropharyngeal exudate. Eyes: Conjunctivae and EOM are normal. Pupils are equal, round, and reactive to light. Neck: Normal range of motion. Neck supple. No JVD present. No tracheal deviation present. Cardiovascular: Normal rate, regular rhythm, normal heart sounds and intact distal pulses. No murmur heard. Pulmonary/Chest: Effort normal and breath sounds normal. No stridor. No respiratory distress. He has no wheezes. He has no rales. He exhibits no tenderness. Abdominal: Soft. He exhibits no distension. There is no tenderness. There is no rebound and no guarding. Musculoskeletal: Normal range of motion. He exhibits no edema or tenderness. Neurological: He is alert and oriented to person, place, and time. No cranial nerve deficit. No gross motor or sensory deficits    Skin: Skin is warm and dry. He is not diaphoretic.    Psychiatric: He has a normal mood and affect. His behavior is normal.   Nursing note and vitals reviewed. Diagnostic Study Results     Labs -     Recent Results (from the past 12 hour(s))   EKG, 12 LEAD, INITIAL    Collection Time: 03/11/19  1:38 PM   Result Value Ref Range    Ventricular Rate 62 BPM    Atrial Rate 62 BPM    P-R Interval 162 ms    QRS Duration 84 ms    Q-T Interval 426 ms    QTC Calculation (Bezet) 432 ms    Calculated P Axis 24 degrees    Calculated R Axis 10 degrees    Calculated T Axis 81 degrees    Diagnosis       Normal sinus rhythm  ST elevation, consider inferior injury or acute infarct  ST elevation, consider anterior injury or acute infarct  ** ** ACUTE MI / STEMI ** **  Consider right ventricular involvement in acute inferior infarct  No previous ECGs available         Radiologic Studies -   No orders to display     CT Results  (Last 48 hours)    None        CXR Results  (Last 48 hours)    None            Medical Decision Making   I am the first provider for this patient. I reviewed the vital signs, available nursing notes, past medical history, past surgical history, family history and social history. Vital Signs-Reviewed the patient's vital signs. No data found. EKG interpretation: (Preliminary)  Normal sinus rhythm, rate 62, normal test, normal QRS, ST elevation noted in the inferior and anterior leads. Records Reviewed: Nursing Notes, Old Medical Records and Ambulance Run Sheet    Provider Notes (Medical Decision Making):   Ddx: STEMI    ED Course:   Initial assessment performed. The patients presenting problems have been discussed, and they are in agreement with the care plan formulated and outlined with them. I have encouraged them to ask questions as they arise throughout their visit. Pre-alert STEMI from EMS. Lab cardiology paged prior to the arrival of the patient emergency department. EMS unit in the a.m. once pain or review of the prehospital EKG which does show concerns of ST elevation. Cath Lab staff in the emergency department. Awaiting the arrival of cardiologist.    After review of the EMS EKG patient given aspirin by prehospital providers as well as nitroglycerin. Patient was given a bolus of IV heparin and a dose of oral Brilinta was ordered. RAMAN BENNETT  1960    Time EMS pre-alert: 1:23 pm    Time STEMI called: 1:23 pm    Time arrived on Unit: 1:38 pm, Frances Ellis DO in room    Time EKG completed: 1:38 pm    Time EKG read by provider: 1:38 pm    Time cardiology/cath lab paged: 1:24 pm    Time cardiologist returned call: 1:34 pm    Time Cath Lab returned call:      Time Cardiologist arrived on Unit: 1:44 pm    Time Cath Lab arrived on Unit: 1:35 pm    1:51 pm  Patient sent to cath lab    Critical Care Time:   CRITICAL CARE NOTE :    2:22 PM  IMPENDING DETERIORATION -Cardiovascular    ASSOCIATED RISK FACTORS - Dysrhythmia    MANAGEMENT- Bedside Assessment and Supervision of Care    INTERPRETATION -  ECG, Blood Pressure and Cardiac Output Measures     INTERVENTIONS - hemodynamic mngmt    CASE REVIEW - Medical Sub-Specialist and Nursing    TREATMENT RESPONSE -Unchanged     PERFORMED BY - Self  NOTES   :    I have spent 35 minutes of critical care time involved in lab review, consultations with specialist, family decision- making, bedside attention and documentation. During this entire length of time I was immediately available to the patient . Red Sprain, DO      Disposition:  Admit Note:  1:51 PM  Pt is being admitted by Brian Taylor MD. The results of their tests and reason(s) for their admission have been discussed with pt and/or available family. They convey agreement and understanding for the need to be admitted and for admission diagnosis. PLAN:  Admit    Diagnosis     Clinical Impression:   1. ST elevation myocardial infarction (STEMI), unspecified artery ([facility] [address]    2. ST elevation (STEMI) myocardial infarction [facility])        Attestations:     This note is prepared by Pilar De La Garza, acting as Scribe for DO Bahman Watts DO: The scribe's documentation has been prepared under my direction and personally reviewed by me in its entirety. I confirm that the note above accurately reflects all work, treatment, procedures, and medical decision making performed by me.

## 2019-03-11 NOTE — Clinical Note
Patient transported with a Registered Nurse, [address] [place] [address] / Patient Care [facility] and Monitor.

## 2019-03-11 NOTE — Clinical Note
Lesion located in the Mid LM. Balloon inserted. Balloon inflated using multiple inflations inflation technique. Pressure = 10 maggy; Duration = 15 sec. Inflation 2: Pressure: 15 maggy; Duration: 10 sec.

## 2019-03-11 NOTE — Clinical Note
Lesion located in the Mid LM. Balloon inserted. Balloon inflated using single inflation technique. Pressure = 15 maggy; Duration = 14 sec.

## 2019-03-11 NOTE — Clinical Note
Single view of the left ventricle obtained using power injection. Total volume = 30 mL. Rate = 10 mL/sec. Pressure = 900 PSI. Rate of rise = 1.0 sec.

## 2019-03-12 ENCOUNTER — HOME HEALTH ADMISSION (OUTPATIENT)
Dept: HOME HEALTH SERVICES | Facility: HOME HEALTH | Age: 59
End: 2019-03-12

## 2019-03-12 LAB
ANION GAP SERPL CALC-SCNC: 8 MMOL/L (ref 5–15)
ATRIAL RATE: 59 BPM
ATRIAL RATE: 66 BPM
BUN SERPL-MCNC: 15 MG/DL (ref 6–20)
BUN/CREAT SERPL: 16 (ref 12–20)
CALCIUM SERPL-MCNC: 7.7 MG/DL (ref 8.5–10.1)
CALCULATED P AXIS, ECG09: 22 DEGREES
CALCULATED P AXIS, ECG09: 28 DEGREES
CALCULATED R AXIS, ECG10: 1 DEGREES
CALCULATED R AXIS, ECG10: 9 DEGREES
CALCULATED T AXIS, ECG11: 34 DEGREES
CALCULATED T AXIS, ECG11: 62 DEGREES
CHLORIDE SERPL-SCNC: 108 MMOL/L (ref 97–108)
CK MB CFR SERPL CALC: 13 % (ref 0–2.5)
CK MB CFR SERPL CALC: 17 % (ref 0–2.5)
CK MB SERPL-MCNC: 177.5 NG/ML (ref 5–25)
CK MB SERPL-MCNC: 45.5 NG/ML (ref 5–25)
CK SERPL-CCNC: 1043 U/L (ref 39–308)
CK SERPL-CCNC: 351 U/L (ref 39–308)
CO2 SERPL-SCNC: 25 MMOL/L (ref 21–32)
CREAT SERPL-MCNC: 0.96 MG/DL (ref 0.7–1.3)
DIAGNOSIS, 93000: NORMAL
DIAGNOSIS, 93000: NORMAL
ERYTHROCYTE [DISTWIDTH] IN BLOOD BY AUTOMATED COUNT: 12.3 % (ref 11.5–14.5)
GLUCOSE SERPL-MCNC: 105 MG/DL (ref 65–100)
HCT VFR BLD AUTO: 39.6 % (ref 36.6–50.3)
HGB BLD-MCNC: 13.7 G/DL (ref 12.1–17)
MCH RBC QN AUTO: 32.2 PG (ref 26–34)
MCHC RBC AUTO-ENTMCNC: 34.6 G/DL (ref 30–36.5)
MCV RBC AUTO: 93.2 FL (ref 80–99)
NRBC # BLD: 0 K/UL (ref 0–0.01)
NRBC BLD-RTO: 0 PER 100 WBC
P-R INTERVAL, ECG05: 160 MS
P-R INTERVAL, ECG05: 162 MS
PLATELET # BLD AUTO: 170 K/UL (ref 150–400)
PMV BLD AUTO: 11.5 FL (ref 8.9–12.9)
POTASSIUM SERPL-SCNC: 4.2 MMOL/L (ref 3.5–5.1)
Q-T INTERVAL, ECG07: 396 MS
Q-T INTERVAL, ECG07: 408 MS
QRS DURATION, ECG06: 78 MS
QRS DURATION, ECG06: 80 MS
QTC CALCULATION (BEZET), ECG08: 392 MS
QTC CALCULATION (BEZET), ECG08: 427 MS
RBC # BLD AUTO: 4.25 M/UL (ref 4.1–5.7)
SODIUM SERPL-SCNC: 141 MMOL/L (ref 136–145)
TROPONIN I SERPL-MCNC: 13.4 NG/ML
TROPONIN I SERPL-MCNC: 31.1 NG/ML
VENTRICULAR RATE, ECG03: 59 BPM
VENTRICULAR RATE, ECG03: 66 BPM
WBC # BLD AUTO: 11.7 K/UL (ref 4.1–11.1)

## 2019-03-12 PROCEDURE — 82550 ASSAY OF CK (CPK): CPT

## 2019-03-12 PROCEDURE — 74011250637 HC RX REV CODE- 250/637: Performed by: INTERNAL MEDICINE

## 2019-03-12 PROCEDURE — 65660000000 HC RM CCU STEPDOWN

## 2019-03-12 PROCEDURE — 80048 BASIC METABOLIC PNL TOTAL CA: CPT

## 2019-03-12 PROCEDURE — 93005 ELECTROCARDIOGRAM TRACING: CPT

## 2019-03-12 PROCEDURE — 85027 COMPLETE CBC AUTOMATED: CPT

## 2019-03-12 PROCEDURE — 84484 ASSAY OF TROPONIN QUANT: CPT

## 2019-03-12 PROCEDURE — 36415 COLL VENOUS BLD VENIPUNCTURE: CPT

## 2019-03-12 RX ADMIN — METOPROLOL TARTRATE 25 MG: 25 TABLET ORAL at 18:03

## 2019-03-12 RX ADMIN — PANTOPRAZOLE SODIUM 40 MG: 40 TABLET, DELAYED RELEASE ORAL at 08:02

## 2019-03-12 RX ADMIN — METOPROLOL TARTRATE 25 MG: 25 TABLET ORAL at 08:02

## 2019-03-12 RX ADMIN — Medication 10 ML: at 12:35

## 2019-03-12 RX ADMIN — TICAGRELOR 90 MG: 90 TABLET ORAL at 12:35

## 2019-03-12 RX ADMIN — TICAGRELOR 90 MG: 90 TABLET ORAL at 01:27

## 2019-03-12 RX ADMIN — ACETAMINOPHEN 650 MG: 325 TABLET ORAL at 12:33

## 2019-03-12 RX ADMIN — ASPIRIN 81 MG 81 MG: 81 TABLET ORAL at 08:02

## 2019-03-12 RX ADMIN — ACETAMINOPHEN 650 MG: 325 TABLET ORAL at 19:13

## 2019-03-12 RX ADMIN — Medication 10 ML: at 23:07

## 2019-03-12 RX ADMIN — Medication 10 ML: at 05:42

## 2019-03-12 RX ADMIN — ACETAMINOPHEN 650 MG: 325 TABLET ORAL at 05:42

## 2019-03-12 NOTE — PROGRESS NOTES
Patient provided copies of insurance cards. Copies placed on chart. Copies tubed to [id number] [facility] in Patient Access. Tanisha dexter.     Kelli Dooley RN CM  Ext [phone]

## 2019-03-12 NOTE — CARDIO/PULMONARY
C/P rehab note- chart reviewed. Adm with STEMI 3/11/2019. S/P PCI/stenting on 3/11/2019. LVEF 45%. HX includes GERD,HTN,HLD. Nonsmoker. MI education folder, with catheterization brochure, to bedside of Jayla Ray. Educated using teach back method. Reviewed MI diagnosis definition and purpose of intervention. Discussed risk factors for CAD to include the following: family history, elevated BMI, hyperlipidemia, hypertension,  stress. Discussed Heart Healthy/Low Sodium (2000 mg) diet. Reviewed the importance of medication compliance, the purpose of BRILINTA and potential side effects. Discussed follow up appointments with cardiologist, signs and symptoms of angina, and what to report to physician after discharge. Emphasized the value of cardiac rehab. Discussed Cardiac Rehab Program format, benefits, and encouraged enrollment to assist with risk modification and management. Initial Cardiac Rehab Program intake appointment scheduled for  April 26 at 0900 and appointment information is on the AVS.    Jayla Ray verbalized understanding with questions answered.   Samra Patotn RN
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
## 2019-03-12 NOTE — PROGRESS NOTES
Bedside shift change report given to Gerhardt Charles, RN (oncoming nurse) by Juan Her RN (offgoing nurse). Report included the following information SBAR, Kardex, Procedure Summary, Intake/Output, MAR, Accordion, Recent Results, Med Rec Status, Cardiac Rhythm NSR, Alarm Parameters , Pre Procedure Checklist, Procedure Verification and Quality Measures.

## 2019-03-12 NOTE — PROGRESS NOTES
Reason for Admission:   STEMI                   RRAT Score:    6                 Plan for utilizing home health:      [facility]                    Likelihood of Readmission: Mod to high                         Transition of Care Plan:         [facility] and follow up appointments. CM met with patient to discuss discharge planning. Pt name and  confirmed as pt identifiers. Demographics to be updated. Patient is single and lives in a single story home. ADL's/IADL's - independent prior to admission to include steps and driving. Patient is employed  DME - none  Preferred Rx - [facility]    Change of address:  [facility]    Patient informed CM he no longer has [employer]. I have [employer] now. Does not have insurance card on him at this time. New PCP -  Celeste Page MD  [address] [place] [address] 1, [address] [place] [address]  [phone]    Cm offered [facility] and patient agreed. Patient's family can provide transportation home at time of discharge. Care Management Interventions  PCP Verified by CM: Yes(Pranav Mcclelland MD)  Mode of Transport at Discharge:  Other (see comment)(family)  Transition of Care Consult (CM Consult): Home Health([facility])  [address] [place] [address] Road: Yes  Physical Therapy Consult: No  Occupational Therapy Consult: No  Speech Therapy Consult: No  Current Support Network: Lives Alone(1 story home )  Confirm Follow Up Transport: Family  Plan discussed with Pt/Family/Caregiver: Yes  Discharge Location  Discharge Placement: Home with home health      Lieutenant Radha RN CM  Ext [phone]

## 2019-03-12 NOTE — PROGRESS NOTES
Pt. Requested for Tylenol for soreness to right groin site. Contacted Dr. Tahir Lui, received order for Tyelnol 650 mg q6 PRN.

## 2019-03-12 NOTE — PROGRESS NOTES
Problem: Falls - Risk of  Goal: *Absence of Falls  Document Savi Fall Risk and appropriate interventions in the flowsheet. Outcome: Progressing Towards Goal  Fall Risk Interventions:            Medication Interventions: Assess postural VS orthostatic hypotension, Evaluate medications/consider consulting pharmacy, Patient to call before getting OOB, Teach patient to arise slowly                  Problem: Pressure Injury - Risk of  Goal: *Prevention of pressure injury  Document Néstor Scale and appropriate interventions in the flowsheet.   Outcome: Progressing Towards Goal  Pressure Injury Interventions:

## 2019-03-12 NOTE — PROGRESS NOTES
Benny Weston from  informed this CM that patient is a [facility]IE patient. Joyce Gaxiola from ED has informed the patient. When patient is medically stable he will need to be transferred to a participating facility. CM called Dr. Marija Wheeler office to inform. Spoke with Sterling Crook. MD will return call. 1  Dr. Matthew Figueroa informed of patient insurance.       Jin Viera RN CM  Ext [phone]

## 2019-03-13 VITALS
OXYGEN SATURATION: 98 % | RESPIRATION RATE: 16 BRPM | HEART RATE: 68 BPM | BODY MASS INDEX: 25.01 KG/M2 | DIASTOLIC BLOOD PRESSURE: 70 MMHG | HEIGHT: 65 IN | WEIGHT: 150.13 LBS | SYSTOLIC BLOOD PRESSURE: 109 MMHG | TEMPERATURE: 98.6 F

## 2019-03-13 LAB
ACT BLD: 164 SECS (ref 79–138)
ATRIAL RATE: 58 BPM
CALCULATED P AXIS, ECG09: 16 DEGREES
CALCULATED R AXIS, ECG10: -7 DEGREES
CALCULATED T AXIS, ECG11: 56 DEGREES
DIAGNOSIS, 93000: NORMAL
P-R INTERVAL, ECG05: 160 MS
Q-T INTERVAL, ECG07: 460 MS
QRS DURATION, ECG06: 78 MS
QTC CALCULATION (BEZET), ECG08: 451 MS
VENTRICULAR RATE, ECG03: 58 BPM

## 2019-03-13 PROCEDURE — 93005 ELECTROCARDIOGRAM TRACING: CPT

## 2019-03-13 PROCEDURE — 74011250637 HC RX REV CODE- 250/637: Performed by: INTERNAL MEDICINE

## 2019-03-13 RX ORDER — GUAIFENESIN 100 MG/5ML
81 LIQUID (ML) ORAL DAILY
Status: SHIPPED | COMMUNITY
Start: 2019-03-13

## 2019-03-13 RX ORDER — ACETAMINOPHEN 325 MG/1
650 TABLET ORAL
Status: SHIPPED | COMMUNITY
Start: 2019-03-13

## 2019-03-13 RX ORDER — ATORVASTATIN CALCIUM 80 MG/1
80 TABLET, FILM COATED ORAL
Qty: 30 TAB | Refills: 12 | Status: SHIPPED | OUTPATIENT
Start: 2019-03-13

## 2019-03-13 RX ADMIN — ASPIRIN 81 MG 81 MG: 81 TABLET ORAL at 08:41

## 2019-03-13 RX ADMIN — TICAGRELOR 90 MG: 90 TABLET ORAL at 09:44

## 2019-03-13 RX ADMIN — ATORVASTATIN CALCIUM 80 MG: 40 TABLET, FILM COATED ORAL at 08:41

## 2019-03-13 RX ADMIN — Medication 10 ML: at 06:33

## 2019-03-13 RX ADMIN — TICAGRELOR 90 MG: 90 TABLET ORAL at 00:53

## 2019-03-13 RX ADMIN — METOPROLOL TARTRATE 25 MG: 25 TABLET ORAL at 08:41

## 2019-03-13 RX ADMIN — PANTOPRAZOLE SODIUM 40 MG: 40 TABLET, DELAYED RELEASE ORAL at 08:41

## 2019-03-13 NOTE — PROGRESS NOTES
Pt is medically stable for dc home today.  [facility] has accepted for [facility] visit as indicated on AVS. F/U appmts as indicated on AVS.    Az Armenta, BRETT

## 2019-03-13 NOTE — PROGRESS NOTES
Bedside shift change report given to Marina Mahmood RN (oncoming nurse) by Corby Bernabe RN (offgoing nurse). Report included the following information SBAR, Kardex, Procedure Summary, Intake/Output, MAR, Accordion, Recent Results, Med Rec Status, Cardiac Rhythm NSR, Alarm Parameters , Pre Procedure Checklist, Procedure Verification and Quality Measures.

## 2019-03-13 NOTE — PROGRESS NOTES
Bedside report received from [facility], RN                  Assessment, Background, Procedure summary, Intake/Output, MAR, and recent results discussed. Care assumed. Pt ambulated in hallway. Pt appears steady and has no complaints of pain, shortness of breath, dizziness, or light-headedness. Incision appears clean, dry, and intact with no swelling or hematoma present. Discharge instructions reviewed with patient and family. Allowed adequate time to ask questions, all questions answered. Printed copy of AVS given to patient. All belongings gathered, IV and tele discontinued. Transported via wheelchair by volunteer to main entrance and into care of family.

## 2019-03-13 NOTE — PROGRESS NOTES
Problem: Pressure Injury - Risk of  Goal: *Prevention of pressure injury  Document Néstor Scale and appropriate interventions in the flowsheet. Outcome: Progressing Towards Goal  Pressure Injury Interventions:                                           Problem: Falls - Risk of  Goal: *Absence of Falls  Document Savi Fall Risk and appropriate interventions in the flowsheet.   Fall Risk Interventions:            Medication Interventions: Assess postural VS orthostatic hypotension, Evaluate medications/consider consulting pharmacy, Patient to call before getting OOB, Teach patient to arise slowly

## 2019-03-16 ENCOUNTER — HOME CARE VISIT (OUTPATIENT)
Dept: SCHEDULING | Facility: HOME HEALTH | Age: 59
End: 2019-03-16

## 2019-03-16 PROCEDURE — G0299 HHS/HOSPICE OF RN EA 15 MIN: HCPCS
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
## 2019-04-26 ENCOUNTER — APPOINTMENT (OUTPATIENT)
Dept: CARDIAC REHAB | Age: 59
End: 2019-04-26

## 2019-09-25 PROBLEM — Z00.00 ANNUAL PHYSICAL EXAM: Status: RESOLVED | Noted: 2017-08-11 | Resolved: 2019-09-25

## (undated) DEVICE — BLADE ASSEMB CLP HAIR FINE --

## (undated) DEVICE — CATHETER ETER DIAG L110CM OD5FR VASC PGTL COR AD PERFORMA

## (undated) DEVICE — NDL PERC VASC ACC 18GX2.75I --

## (undated) DEVICE — ANGIO-SEAL VIP VASCULAR CLOSURE DEVICE: Brand: ANGIO-SEAL

## (undated) DEVICE — CUSTOM KT PTCA INFL DEV K05 00053H

## (undated) DEVICE — ADULT SPO2 SENSOR: Brand: NELLCOR

## (undated) DEVICE — SYR POWER 150ML 8IN FILL TUBE --

## (undated) DEVICE — KIT ANGIOGRAPHY CUST MRMC

## (undated) DEVICE — GUIDEWIRE VASC L145CM 0.035IN J TIP L3MM PTFE FIX COR NAMIC

## (undated) DEVICE — MEDI-TRACE CADENCE ADULT, DEFIBRILLATION ELECTRODE -RTS  (10 PR/PK) - PHILIPS: Brand: MEDI-TRACE CADENCE

## (undated) DEVICE — CATH GUID COR EB35 6FR 100CM -- LAUNCHER

## (undated) DEVICE — Device: Brand: PROWATER

## (undated) DEVICE — GUIDEWIRE WITH ICE™ HYDROPHILIC COATING: Brand: CHOICE™ PT

## (undated) DEVICE — MINI TREK™ II CORONARY DILATATION CATHETER 2.00 MM X 12 MM / OVER-THE-WIRE: Brand: MINI TREK™

## (undated) DEVICE — PACK PROCEDURE SURG HRT CATH

## (undated) DEVICE — CATHETER ANGIO JR4 AD 5 FRX100 CM 25 CM PERFORMA

## (undated) DEVICE — NC TREK CORONARY DILATATION CATHETER 3.0 MM X 12 MM / RAPID-EXCHANGE: Brand: NC TREK

## (undated) DEVICE — PINNACLE INTRODUCER SHEATH: Brand: PINNACLE